# Patient Record
Sex: FEMALE | Race: OTHER | HISPANIC OR LATINO | URBAN - METROPOLITAN AREA
[De-identification: names, ages, dates, MRNs, and addresses within clinical notes are randomized per-mention and may not be internally consistent; named-entity substitution may affect disease eponyms.]

---

## 2023-06-09 RX ORDER — LEVOTHYROXINE SODIUM 125 MCG
1 TABLET ORAL
Refills: 0 | DISCHARGE

## 2023-06-09 RX ORDER — SODIUM CHLORIDE 9 MG/ML
3 INJECTION INTRAMUSCULAR; INTRAVENOUS; SUBCUTANEOUS EVERY 8 HOURS
Refills: 0 | Status: DISCONTINUED | OUTPATIENT
Start: 2023-06-10 | End: 2023-06-10

## 2023-06-09 RX ORDER — LEFLUNOMIDE 10 MG/1
1 TABLET ORAL
Refills: 0 | DISCHARGE

## 2023-06-09 RX ORDER — DICLOFENAC SODIUM 30 MG/G
2 GEL TOPICAL
Refills: 0 | DISCHARGE

## 2023-06-09 NOTE — ASU PATIENT PROFILE, ADULT - AS SC BRADEN FRICTION
RN spoke to the pt- advised her to leave incision JONAH and no creams, ointments at this time. Pt stated understanding.    (3) no apparent problem

## 2023-06-09 NOTE — ASU PATIENT PROFILE, ADULT - FALL HARM RISK - UNIVERSAL INTERVENTIONS
Bed in lowest position, wheels locked, appropriate side rails in place/Call bell, personal items and telephone in reach/Instruct patient to call for assistance before getting out of bed or chair/Non-slip footwear when patient is out of bed/[place] to call system/Physically safe environment - no spills, clutter or unnecessary equipment/Purposeful Proactive Rounding/Room/bathroom lighting operational, light cord in reach

## 2023-06-10 ENCOUNTER — OUTPATIENT (OUTPATIENT)
Dept: OUTPATIENT SERVICES | Facility: HOSPITAL | Age: 48
LOS: 1 days | Discharge: HOME | End: 2023-06-10
Payer: COMMERCIAL

## 2023-06-10 VITALS
TEMPERATURE: 97 F | SYSTOLIC BLOOD PRESSURE: 104 MMHG | OXYGEN SATURATION: 100 % | HEART RATE: 67 BPM | DIASTOLIC BLOOD PRESSURE: 55 MMHG | RESPIRATION RATE: 11 BRPM

## 2023-06-10 VITALS
RESPIRATION RATE: 16 BRPM | WEIGHT: 130.07 LBS | DIASTOLIC BLOOD PRESSURE: 61 MMHG | HEIGHT: 62 IN | SYSTOLIC BLOOD PRESSURE: 94 MMHG | TEMPERATURE: 98 F | HEART RATE: 86 BPM

## 2023-06-10 DIAGNOSIS — Z98.891 HISTORY OF UTERINE SCAR FROM PREVIOUS SURGERY: Chronic | ICD-10-CM

## 2023-06-10 DIAGNOSIS — Z98.890 OTHER SPECIFIED POSTPROCEDURAL STATES: Chronic | ICD-10-CM

## 2023-06-10 LAB
BLD GP AB SCN SERPL QL: NEGATIVE — SIGNIFICANT CHANGE UP
RH IG SCN BLD-IMP: POSITIVE — SIGNIFICANT CHANGE UP

## 2023-06-10 PROCEDURE — 58558 HYSTEROSCOPY BIOPSY: CPT

## 2023-06-10 PROCEDURE — 86901 BLOOD TYPING SEROLOGIC RH(D): CPT

## 2023-06-10 PROCEDURE — 88305 TISSUE EXAM BY PATHOLOGIST: CPT | Mod: 26

## 2023-06-10 PROCEDURE — 86850 RBC ANTIBODY SCREEN: CPT

## 2023-06-10 PROCEDURE — 86900 BLOOD TYPING SEROLOGIC ABO: CPT

## 2023-06-10 PROCEDURE — 88305 TISSUE EXAM BY PATHOLOGIST: CPT

## 2023-06-10 DEVICE — MYOSURE TISSUE REMOVAL DEVICE REACH: Type: IMPLANTABLE DEVICE | Status: FUNCTIONAL

## 2023-06-10 DEVICE — MYOSURE TISSUE REMOVAL DEVICE XL: Type: IMPLANTABLE DEVICE | Status: FUNCTIONAL

## 2023-06-10 RX ORDER — ONDANSETRON 8 MG/1
8 TABLET, FILM COATED ORAL EVERY 8 HOURS
Refills: 0 | Status: DISCONTINUED | OUTPATIENT
Start: 2023-06-10 | End: 2023-06-10

## 2023-06-10 RX ORDER — SODIUM CHLORIDE 9 MG/ML
1000 INJECTION, SOLUTION INTRAVENOUS
Refills: 0 | Status: DISCONTINUED | OUTPATIENT
Start: 2023-06-10 | End: 2023-06-10

## 2023-06-10 RX ORDER — ACETAMINOPHEN 500 MG
1000 TABLET ORAL EVERY 6 HOURS
Refills: 0 | Status: DISCONTINUED | OUTPATIENT
Start: 2023-06-10 | End: 2023-06-10

## 2023-06-10 RX ORDER — SIMETHICONE 80 MG/1
80 TABLET, CHEWABLE ORAL EVERY 6 HOURS
Refills: 0 | Status: DISCONTINUED | OUTPATIENT
Start: 2023-06-10 | End: 2023-06-10

## 2023-06-10 RX ADMIN — Medication 1000 MILLIGRAM(S): at 11:12

## 2023-06-10 NOTE — PRE-ANESTHESIA EVALUATION ADULT - NSANTHOSAYNRD_GEN_A_CORE
No. LIBORIO screening performed.  STOP BANG Legend: 0-2 = LOW Risk; 3-4 = INTERMEDIATE Risk; 5-8 = HIGH Risk

## 2023-06-10 NOTE — ASU DISCHARGE PLAN (ADULT/PEDIATRIC) - CARE PROVIDER_API CALL
Diony Nguyen  Obstetrics and Gynecology  [address], [address]  [place] [address]  Phone: [phone]  Fax: [phone]  Follow Up Time:

## 2023-06-10 NOTE — ASU DISCHARGE PLAN (ADULT/PEDIATRIC) - NS MD DC FALL RISK RISK
For information on Fall & Injury Prevention, visit: [email or web address] OR  [email or web address] OR  https://www.cdc.gov/steadi/patient.html

## 2023-06-22 LAB — SURGICAL PATHOLOGY STUDY: SIGNIFICANT CHANGE UP

## 2025-01-11 ENCOUNTER — EMERGENCY (EMERGENCY)
Facility: HOSPITAL | Age: 50
LOS: 1 days | Discharge: HOME | End: 2025-01-11
Attending: EMERGENCY MEDICINE | Admitting: EMERGENCY MEDICINE
Payer: COMMERCIAL

## 2025-01-11 VITALS
HEIGHT: 62 IN | HEART RATE: 107 BPM | SYSTOLIC BLOOD PRESSURE: 145 MMHG | RESPIRATION RATE: 18 BRPM | WEIGHT: 128.09 LBS | DIASTOLIC BLOOD PRESSURE: 65 MMHG | OXYGEN SATURATION: 100 % | TEMPERATURE: 99 F

## 2025-01-11 VITALS
TEMPERATURE: 98 F | DIASTOLIC BLOOD PRESSURE: 67 MMHG | OXYGEN SATURATION: 100 % | SYSTOLIC BLOOD PRESSURE: 106 MMHG | RESPIRATION RATE: 16 BRPM | HEART RATE: 67 BPM

## 2025-01-11 DIAGNOSIS — Z91.040 LATEX ALLERGY STATUS: ICD-10-CM

## 2025-01-11 DIAGNOSIS — Z88.0 ALLERGY STATUS TO PENICILLIN: ICD-10-CM

## 2025-01-11 DIAGNOSIS — Z98.890 OTHER SPECIFIED POSTPROCEDURAL STATES: Chronic | ICD-10-CM

## 2025-01-11 DIAGNOSIS — R22.0 LOCALIZED SWELLING, MASS AND LUMP, HEAD: ICD-10-CM

## 2025-01-11 DIAGNOSIS — R05.1 ACUTE COUGH: ICD-10-CM

## 2025-01-11 DIAGNOSIS — R00.0 TACHYCARDIA, UNSPECIFIED: ICD-10-CM

## 2025-01-11 DIAGNOSIS — N73.9 FEMALE PELVIC INFLAMMATORY DISEASE, UNSPECIFIED: ICD-10-CM

## 2025-01-11 DIAGNOSIS — E03.9 HYPOTHYROIDISM, UNSPECIFIED: ICD-10-CM

## 2025-01-11 DIAGNOSIS — D25.9 LEIOMYOMA OF UTERUS, UNSPECIFIED: ICD-10-CM

## 2025-01-11 DIAGNOSIS — Z98.891 HISTORY OF UTERINE SCAR FROM PREVIOUS SURGERY: Chronic | ICD-10-CM

## 2025-01-11 PROBLEM — M06.9 RHEUMATOID ARTHRITIS, UNSPECIFIED: Chronic | Status: ACTIVE | Noted: 2023-06-09

## 2025-01-11 PROBLEM — E06.3 AUTOIMMUNE THYROIDITIS: Chronic | Status: ACTIVE | Noted: 2023-06-09

## 2025-01-11 LAB
ALBUMIN SERPL ELPH-MCNC: 4.2 G/DL — SIGNIFICANT CHANGE UP (ref 3.3–5)
ALP SERPL-CCNC: 85 U/L — SIGNIFICANT CHANGE UP (ref 40–120)
ALT FLD-CCNC: 17 U/L — SIGNIFICANT CHANGE UP (ref 10–45)
ANION GAP SERPL CALC-SCNC: 13 MMOL/L — SIGNIFICANT CHANGE UP (ref 5–17)
APPEARANCE UR: CLEAR — SIGNIFICANT CHANGE UP
AST SERPL-CCNC: SIGNIFICANT CHANGE UP (ref 10–40)
BASOPHILS # BLD AUTO: 0 K/UL — SIGNIFICANT CHANGE UP (ref 0–0.2)
BASOPHILS NFR BLD AUTO: 0 % — SIGNIFICANT CHANGE UP (ref 0–2)
BILIRUB SERPL-MCNC: 0.3 MG/DL — SIGNIFICANT CHANGE UP (ref 0.2–1.2)
BILIRUB UR-MCNC: NEGATIVE — SIGNIFICANT CHANGE UP
BUN SERPL-MCNC: 9 MG/DL — SIGNIFICANT CHANGE UP (ref 7–23)
CALCIUM SERPL-MCNC: 9.4 MG/DL — SIGNIFICANT CHANGE UP (ref 8.4–10.5)
CHLORIDE SERPL-SCNC: 101 MMOL/L — SIGNIFICANT CHANGE UP (ref 96–108)
CK MB CFR SERPL CALC: 3.5 NG/ML — SIGNIFICANT CHANGE UP (ref 0–6.7)
CO2 SERPL-SCNC: 23 MMOL/L — SIGNIFICANT CHANGE UP (ref 22–31)
COLOR SPEC: YELLOW — SIGNIFICANT CHANGE UP
CREAT SERPL-MCNC: 0.6 MG/DL — SIGNIFICANT CHANGE UP (ref 0.5–1.3)
DIFF PNL FLD: NEGATIVE — SIGNIFICANT CHANGE UP
EGFR: 111 ML/MIN/1.73M2 — SIGNIFICANT CHANGE UP
EOSINOPHIL # BLD AUTO: 1.04 K/UL — HIGH (ref 0–0.5)
EOSINOPHIL NFR BLD AUTO: 8.8 % — HIGH (ref 0–6)
FLUAV AG NPH QL: SIGNIFICANT CHANGE UP
FLUBV AG NPH QL: SIGNIFICANT CHANGE UP
GLUCOSE SERPL-MCNC: 89 MG/DL — SIGNIFICANT CHANGE UP (ref 70–99)
GLUCOSE UR QL: NEGATIVE MG/DL — SIGNIFICANT CHANGE UP
HCT VFR BLD CALC: 35.3 % — SIGNIFICANT CHANGE UP (ref 34.5–45)
HGB BLD-MCNC: 11.3 G/DL — LOW (ref 11.5–15.5)
KETONES UR-MCNC: 15 MG/DL
LACTATE SERPL-SCNC: 1 MMOL/L — SIGNIFICANT CHANGE UP (ref 0.5–2)
LEUKOCYTE ESTERASE UR-ACNC: ABNORMAL
LIDOCAIN IGE QN: 50 U/L — SIGNIFICANT CHANGE UP (ref 7–60)
LYMPHOCYTES # BLD AUTO: 0.32 K/UL — LOW (ref 1–3.3)
LYMPHOCYTES # BLD AUTO: 2.7 % — LOW (ref 13–44)
MCHC RBC-ENTMCNC: 25.9 PG — LOW (ref 27–34)
MCHC RBC-ENTMCNC: 32 G/DL — SIGNIFICANT CHANGE UP (ref 32–36)
MCV RBC AUTO: 80.8 FL — SIGNIFICANT CHANGE UP (ref 80–100)
MONOCYTES # BLD AUTO: 0.32 K/UL — SIGNIFICANT CHANGE UP (ref 0–0.9)
MONOCYTES NFR BLD AUTO: 2.7 % — SIGNIFICANT CHANGE UP (ref 2–14)
NEUTROPHILS # BLD AUTO: 10.18 K/UL — HIGH (ref 1.8–7.4)
NEUTROPHILS NFR BLD AUTO: 85.8 % — HIGH (ref 43–77)
NITRITE UR-MCNC: NEGATIVE — SIGNIFICANT CHANGE UP
PH UR: 6.5 — SIGNIFICANT CHANGE UP (ref 5–8)
PLATELET # BLD AUTO: 258 K/UL — SIGNIFICANT CHANGE UP (ref 150–400)
POTASSIUM SERPL-MCNC: 4.4 MMOL/L — SIGNIFICANT CHANGE UP (ref 3.5–5.3)
POTASSIUM SERPL-SCNC: 4.4 MMOL/L — SIGNIFICANT CHANGE UP (ref 3.5–5.3)
PROT SERPL-MCNC: 8.1 G/DL — SIGNIFICANT CHANGE UP (ref 6–8.3)
PROT UR-MCNC: NEGATIVE MG/DL — SIGNIFICANT CHANGE UP
RBC # BLD: 4.37 M/UL — SIGNIFICANT CHANGE UP (ref 3.8–5.2)
RBC # FLD: 14.8 % — HIGH (ref 10.3–14.5)
RSV RNA NPH QL NAA+NON-PROBE: SIGNIFICANT CHANGE UP
SARS-COV-2 RNA SPEC QL NAA+PROBE: SIGNIFICANT CHANGE UP
SODIUM SERPL-SCNC: 137 MMOL/L — SIGNIFICANT CHANGE UP (ref 135–145)
SP GR SPEC: 1.02 — SIGNIFICANT CHANGE UP (ref 1–1.03)
TROPONIN T, HIGH SENSITIVITY RESULT: <6 NG/L — SIGNIFICANT CHANGE UP (ref 0–51)
UROBILINOGEN FLD QL: 0.2 MG/DL — SIGNIFICANT CHANGE UP (ref 0.2–1)
WBC # BLD: 11.86 K/UL — HIGH (ref 3.8–10.5)
WBC # FLD AUTO: 11.86 K/UL — HIGH (ref 3.8–10.5)

## 2025-01-11 PROCEDURE — 82550 ASSAY OF CK (CPK): CPT

## 2025-01-11 PROCEDURE — 87637 SARSCOV2&INF A&B&RSV AMP PRB: CPT

## 2025-01-11 PROCEDURE — 76856 US EXAM PELVIC COMPLETE: CPT | Mod: 26,59

## 2025-01-11 PROCEDURE — 83605 ASSAY OF LACTIC ACID: CPT

## 2025-01-11 PROCEDURE — 96374 THER/PROPH/DIAG INJ IV PUSH: CPT

## 2025-01-11 PROCEDURE — 87481 CANDIDA DNA AMP PROBE: CPT

## 2025-01-11 PROCEDURE — 87086 URINE CULTURE/COLONY COUNT: CPT

## 2025-01-11 PROCEDURE — 99285 EMERGENCY DEPT VISIT HI MDM: CPT

## 2025-01-11 PROCEDURE — 99285 EMERGENCY DEPT VISIT HI MDM: CPT | Mod: 25

## 2025-01-11 PROCEDURE — 76830 TRANSVAGINAL US NON-OB: CPT

## 2025-01-11 PROCEDURE — 81001 URINALYSIS AUTO W/SCOPE: CPT

## 2025-01-11 PROCEDURE — 93005 ELECTROCARDIOGRAM TRACING: CPT

## 2025-01-11 PROCEDURE — 76856 US EXAM PELVIC COMPLETE: CPT

## 2025-01-11 PROCEDURE — 96375 TX/PRO/DX INJ NEW DRUG ADDON: CPT

## 2025-01-11 PROCEDURE — 87040 BLOOD CULTURE FOR BACTERIA: CPT

## 2025-01-11 PROCEDURE — 36415 COLL VENOUS BLD VENIPUNCTURE: CPT

## 2025-01-11 PROCEDURE — 84484 ASSAY OF TROPONIN QUANT: CPT

## 2025-01-11 PROCEDURE — 87077 CULTURE AEROBIC IDENTIFY: CPT

## 2025-01-11 PROCEDURE — 0241U: CPT

## 2025-01-11 PROCEDURE — 71046 X-RAY EXAM CHEST 2 VIEWS: CPT

## 2025-01-11 PROCEDURE — 96372 THER/PROPH/DIAG INJ SC/IM: CPT | Mod: XU

## 2025-01-11 PROCEDURE — 76830 TRANSVAGINAL US NON-OB: CPT | Mod: 26

## 2025-01-11 PROCEDURE — 85025 COMPLETE CBC W/AUTO DIFF WBC: CPT

## 2025-01-11 PROCEDURE — 80053 COMPREHEN METABOLIC PANEL: CPT

## 2025-01-11 PROCEDURE — 87661 TRICHOMONAS VAGINALIS AMPLIF: CPT

## 2025-01-11 PROCEDURE — 71046 X-RAY EXAM CHEST 2 VIEWS: CPT | Mod: 26

## 2025-01-11 PROCEDURE — 83690 ASSAY OF LIPASE: CPT

## 2025-01-11 PROCEDURE — 82553 CREATINE MB FRACTION: CPT

## 2025-01-11 PROCEDURE — 93010 ELECTROCARDIOGRAM REPORT: CPT

## 2025-01-11 PROCEDURE — 81513 NFCT DS BV RNA VAG FLU ALG: CPT

## 2025-01-11 RX ORDER — DOXYCYCLINE MONOHYDRATE 100 MG
1 TABLET ORAL
Qty: 20 | Refills: 0
Start: 2025-01-11 | End: 2025-01-20

## 2025-01-11 RX ORDER — CEFOTETAN DISODIUM 2 G
2 VIAL (EA) INJECTION ONCE
Refills: 0 | Status: COMPLETED | OUTPATIENT
Start: 2025-01-11 | End: 2025-01-11

## 2025-01-11 RX ORDER — CEFTRIAXONE SODIUM 1 G/1
500 INJECTION, POWDER, FOR SOLUTION INTRAMUSCULAR; INTRAVENOUS ONCE
Refills: 0 | Status: COMPLETED | OUTPATIENT
Start: 2025-01-11 | End: 2025-01-11

## 2025-01-11 RX ORDER — DOXYCYCLINE MONOHYDRATE 100 MG
100 TABLET ORAL ONCE
Refills: 0 | Status: COMPLETED | OUTPATIENT
Start: 2025-01-11 | End: 2025-01-11

## 2025-01-11 RX ORDER — SODIUM CHLORIDE 9 MG/ML
1000 INJECTION, SOLUTION INTRAMUSCULAR; INTRAVENOUS; SUBCUTANEOUS ONCE
Refills: 0 | Status: COMPLETED | OUTPATIENT
Start: 2025-01-11 | End: 2025-01-11

## 2025-01-11 RX ADMIN — Medication 110 MILLIGRAM(S): at 22:05

## 2025-01-11 RX ADMIN — CEFTRIAXONE SODIUM 500 MILLIGRAM(S): 1 INJECTION, POWDER, FOR SOLUTION INTRAMUSCULAR; INTRAVENOUS at 22:09

## 2025-01-11 RX ADMIN — SODIUM CHLORIDE 1000 MILLILITER(S): 9 INJECTION, SOLUTION INTRAMUSCULAR; INTRAVENOUS; SUBCUTANEOUS at 16:17

## 2025-01-11 RX ADMIN — Medication 100 GRAM(S): at 19:55

## 2025-01-11 NOTE — ED ADULT NURSE NOTE - CHIEF COMPLAINT QUOTE
Pt c/o allergic rxn to H. Pylori medication x 2 days. Pt continued taking medication, last dose this morning. C/o swelling around mouth/lips, throat and ear irritation. Denies voice changes, sob. Pt given 50mg benadryl IM and 10mg decadron IM at [facility]. Pt speaking in full, clear sentences.

## 2025-01-11 NOTE — ED PROVIDER NOTE - CLINICAL SUMMARY MEDICAL DECISION MAKING FREE TEXT BOX
49-year-old with 4 days of cough and now developed sensation of facial fullness and lip swelling as well as discomfort swallowing after starting new antibiotics, consider allergic reaction but I am not 100% convinced this was the cause of discomfort swallowing, consider esophagitis from antibiotics as well as consider sore throat/pharyngitis from viral illness, as symptoms are accompanied by a dry cough as well as had a low-grade fever at urgent care today.  Patient's vaginal complaint most likely consistent with yeast infection, plan to evaluate for infection, pelvic exam to evaluate for pelvic infection,  will advised to hold off on H. pylori treatment until further evaluated by allergist.  If exam consistent with likely yeast infection will give Diflucan, will also send cultures and evaluate for PID but suspect less likely.  Dispo pending workup and reevaluation. 49-year-old with 4 days of cough and now developed sensation of facial fullness and lip swelling as well as discomfort swallowing after starting new antibiotics, consider allergic reaction but I am not 100% convinced this was the cause of discomfort swallowing, consider esophagitis from antibiotics as well as consider sore throat/pharyngitis from viral illness, as symptoms are accompanied by a dry cough as well as had a low-grade fever at urgent care today.  Patient's vaginal complaint most likely consistent with yeast infection, plan to evaluate for infection, pelvic exam to evaluate for pelvic infection,  will advised to hold off on H. pylori treatment until further evaluated by allergist.  If exam consistent with likely yeast infection will give Diflucan, will also send cultures and evaluate for PID but suspect less likely.  Dispo pending workup and reevaluation.    FU : pt w pelvic exam very concerning for PID , copious yellow / green discharge, CMT and rt adnexal tender, plan to send for US to eval for TOA, gave cefotetan and doxy, ( avoided metronidazole due to possibility of  recent reaction ) , pt w prior amox reaction although after discussing it I suspect pt w throat pain from her pharyngitis at that time rather than true allergic reaction .   will consult OB/gyn as would consider admission . 49-year-old with 4 days of cough and now developed sensation of facial fullness and lip swelling as well as discomfort swallowing after starting new antibiotics, consider allergic reaction but I am not 100% convinced this was the cause of discomfort swallowing, consider esophagitis from antibiotics as well as consider sore throat/pharyngitis from viral illness, as symptoms are accompanied by a dry cough as well as had a low-grade fever at urgent care today.  Patient's vaginal complaint most likely consistent with yeast infection, plan to evaluate for infection, pelvic exam to evaluate for pelvic infection,  will advised to hold off on H. pylori treatment until further evaluated by allergist.  If exam consistent with likely yeast infection will give Diflucan, will also send cultures and evaluate for PID but suspect less likely.  Dispo pending workup and reevaluation.    FU : pt w pelvic exam very concerning for PID , copious yellow / green discharge, CMT and rt adnexal tender, plan to send for US to eval for TOA, gave cefotetan and doxy, ( avoided metronidazole due to possibility of  recent reaction ) , pt w prior amox reaction although after discussing it I suspect pt w throat pain from her pharyngitis at that time rather than true allergic reaction .     7:30 pm fu pt at ultrasound now , Gyn aware and will consult   will consult OB/gyn as would consider admission .

## 2025-01-11 NOTE — ED PROVIDER NOTE - OBJECTIVE STATEMENT
49-year-old history of hypothyroid ,  rheumatoid arthritis, and H. pylori,  Patient comes in after concern for possible allergic reaction as she started H. pylori meds 2 days ago and today developed lip swelling and facial puffyness,  in addition patient reports prior to start of medication she had a cough and mild sore throat, reports sensation of difficulty swallowing although eating and drinking, no shortness of breath, no rash,  partner with her unsure if lips are swollen, patient went to urgent care where they gave her Decadron and Benadryl,  In addition patient reports itchiness and white discharge vaginally since start of antibiotics.  History of similar event in the past with antibiotics, 49-year-old history of hypothyroid,  rheumatoid arthritis, and H. pylori,  Patient comes in after concern for possible allergic reaction as she started H. pylori meds 2 days ago and today developed lip swelling and facial puffiness,  in addition patient reports prior to start of medication she had a cough and mild sore throat, reports sensation of difficulty swallowing although eating and drinking, no shortness of breath, no rash,  partner with her unsure if lips are swollen, patient went to urgent care where they gave her Decadron and Benadryl,  In addition patient reports itchiness and white discharge vaginally since start of antibiotics 2 days ago. sexually active w , no ho of STD's   History of similar event in the past with antibiotics, abd discomfort across lower pelvis for same time.

## 2025-01-11 NOTE — CONSULT NOTE ADULT - ASSESSMENT
48 yo  w/ LMP 1/3/25 presents from urgent care after noted to have allergic reaction, recently started triple therapy for H. pylori infection, given dexamethasone and diphenhydramine with good response, noted to have pelvic pain and vaginal discharge. GYN consulted for vaginal discharge. Suspected cervicitis at this time given cervical motion tenderness and vaginal discharge without other pelvic or systemic findings.    - Please given IM ceftriaxone 500mg once  - Please Rx PO doxycycline 100mg BID x14 days  - Follow up urine culture, blood culture, vaginitis NAAT panel, and vaginitis w/ candida panel  - Will defer empiric treatment for pelvic inflammatory disease at this time due to possible allergy to metronidazole  - Reviewed return precautions including severe pain, vaginal bleeding, or fever  - Patient will follow up with OBGYN next week.    GL PGY2  Discussed with PGY3 Dr. Blackwood and attending Dr. Nguyen

## 2025-01-11 NOTE — ED PROVIDER NOTE - CARE PROVIDERS DIRECT ADDRESSES
[email or web address]

## 2025-01-11 NOTE — ED ADULT TRIAGE NOTE - CHIEF COMPLAINT QUOTE
Pt c/o allergic rxn to H. Pylori medication x 2 days. Pt continued taking medication, last dose this morning. C/o swelling around mouth/lips, throat and ear irritation. Denies voice changes, sob. Pt given 50mg benadryl IM and 10mg decadron IM at [facility]. Pt speaking in full, clear sentences.

## 2025-01-11 NOTE — ED ADULT NURSE NOTE - AS PAIN REST
Per pharmacy fax, pt states back on pioglitazone, stopped januvia, can you send new RX.     Per Pharmacy fax there are no changes in medication, dosage, sig or quantity. The medication(s) are set up as pending and waiting for your approval. The preferred pharmacy has been set up and verified.     
Refill requested for:   pioglitazone (ACTOS) 30 MG tablet        Sig - Route: Take 30 mg by mouth daily. - Oral      Last visit: 05/13/2021 with: Gopi Child DO for: HTN    Upcoming visit: none    Request forwarded to Gopi Child DO  Please advise on refills.  
0 (no pain/absence of nonverbal indicators of pain)

## 2025-01-11 NOTE — ED PROVIDER NOTE - CARE PROVIDER_API CALL
Diony Nguyen  Obstetrics and Gynecology  [address], [address]  [place] [address]  Phone: [phone]  Fax: [phone]  Follow Up Time:

## 2025-01-11 NOTE — ED PROVIDER NOTE - PATIENT PORTAL LINK FT
You can access the FollowMyHealth Patient Portal offered by [facility] by registering at the following website: [email or web address]. By joining [facility]’s FollowMyHealth portal, you will also be able to view your health information using other applications (apps) compatible with our system.

## 2025-01-11 NOTE — ED ADULT TRIAGE NOTE - WEIGHT METHOD
seated/standing, engagement in fine and gross motor activities, functional transfers and mobility  Sensory integration techniques (69746): that include enhancing sensory processing and promoting responses that are adaptive to environmental demands  Self-care/home management training (85161): that includes restorative and compensatory training in activities of daily living, meal preparation, laundry and other various house maintenance activities. May include education and training in use of adaptive equipment/devices and assistive technology to promote participation and independence.  Cognitive function (21631 initial 15 minutes, 65222 each additional 15 minutes w/ maximum of 3 units billable): activities that address attention, memory, reasoning, executive functioning, problem solving, and/or pragmatic functioning in addition to compensatory strategies to manage the performance of an activity (for example, managing time or schedules, initiating, organizing, and sequencing tasks).  Splinting including custom or pre-fabricated    Plan: Cont per POC    Electronically Signed by Jodi Matthews  Date: 12/19/2024        stated

## 2025-01-11 NOTE — CONSULT NOTE ADULT - SUBJECTIVE AND OBJECTIVE BOX
STEPAN PINEDA     [id number]  50 yo  w/ LMP 1/3/25 presents from urgent care after noted to have allergic reaction, recently started triple therapy for H. pylori infection, given dexamethasone and diphenhydramine with good response, noted to have pelvic pain and vaginal discharge. GYN consulted for vaginal discharge.     Patient reports that she has a long standing history of acid reflux type symptoms. She was seen by GI and underwent upper endoscopy, which revealed H. pylori infection. She was started on triple therapy (i.e., tetracycline, bismuth, and metronidazole) two days ago and today noted to have lip swelling and facial puffiness. She presented to urgent care who recommended prompt transfer to ED. Here, vitals were stable. She was given decadron and benadryl with good symptomatic relief. She then endorsed pelvic pain and vaginal discharge to her provider, who deferred to GYN.    Pelvic pain has been intermittent for approximately 1 week. She denies cramping, nausea, vomiting, fevers. Pelvic pain is bilateral just medial to bilateral ASIS and unchanged with position. Additionally, she has had copious white vaginal discharge that is now causing her to have vaginal irritation and itching. Endorses mild dysuria. Sexually active with 1 male partner: her .    OB: G1  primary CS for hx myomectomy  GYN: Fibroids  PMH: Hypothyroidism; Rheumatoid arthritis; GERD w/ proven H. pylori  PSH: C/S x1; Open myomectomy x2 (2023)    Home Medications:  Arava 20 mg oral tablet: 1 orally once a day (2023 10:37)  diclofenac 1% topical gel: Apply topically to affected area prn (2023 10:37)  levothyroxine 100 mcg (0.1 mg) oral capsule: 1 orally once a day (2023 10:37)  naproxen 250 mg oral tablet: 1 orally 2 times a day (2023 10:37)    Allergies  latex (Rash)  amoxicillin (Angioedema)        PHYSICAL EXAM:   T(C): 36.7 (25 @ 22:40), Max: 37 (25 @ 13:55)  HR: 67 (25 @ 22:40) (67 - 107)  BP: 106/67 (25 @ 22:40) (106/67 - 145/65)  RR: 16 (25 @ 22:40) (16 - 18)  SpO2: 100% (25 @ 22:40) (100% - 100%)    **************************  Constitutional: No acute distress  Respiratory: No increased work of breathing  Abdomen: soft, non tender, positive bowel sounds, no rebound or guarding   Extremities: no calf tenderness or swelling bilaterally  SSE: normal external genitalia; vaginal well supported; copious white serous discharge in vaginal vault; cervix without lesions or bleeding  Bimanual: moderate cervical motion tenderness; minimal bilateral adnexal tenderness      LABS:                        11.3   11. )-----------( 258      ( 2025 14:38 )             35.3       137  |  101  |  9   ----------------------------<  89  4.4   |  23  |  0.60  Ca    9.4      2025 14:38  TPro  8.1  /  Alb  4.2  /  TBili  0.3  /  DBili  x   /  AST  See Note  /  ALT  17  /  AlkPhos  85      Urinalysis Basic - ( 2025 14:38 )  Color: Yellow  Appearance: Clear / S.016 / pH: x  Gluc: 89 mg/dL / Ketone: 15 mg/dL  / Bili: Negative / Urobili: 0.2 mg/dL   Blood: x / Protein: Negative mg/dL / Nitrite: Negative   Leuk Esterase: Moderate / RBC: 4 /HPF / WBC 21 /HPF   Sq Epi: x / Non Sq Epi: 2 /HPF / Bacteria: Negative /HPF      RADIOLOGY & ADDITIONAL STUDIES:    US Pelvis Complete (25 @ 19:29)  TECHNIQUE:  Endovaginal and transabdominal pelvic sonogram. Color and Spectral   Doppler was performed.    FINDINGS:  Uterus: 8.6 cm x 5.0 cm x 7.2 cm. 3.9 cm intramural fibroid at the left   uterine body. 1.1 cm intramural fibroid at the posterior fundus.    scar at the anterior lower uterine segment.  Endometrium: 6 mm. Within normal limits.  Right ovary: 2.0 cm x 1.6 cm x 1.6 cm. Within normal limits. Normal   arterial and venous waveforms.  Left ovary: 2.0 cm x 1.5 cm x 2.0 cm. Within normal limits. Normal   arterial and venous waveforms.  Fluid: None.    IMPRESSION:  No acute findings. No evidence of ovarian torsion during this study. No   adnexal mass or evidence of tubo-ovarian abscess. 3.9 cm dominant fibroid   at the left uterine body.

## 2025-01-11 NOTE — ED ADULT NURSE NOTE - OBJECTIVE STATEMENT
Pt alert, oriented, and ambulatory at time of assessment. Reports facial redness and suspected allergic reaction x 2 days to antibiotic pt is using to treat suspected H pylori. Pt additionally suspects a yeast infection. Denies dizziness or weakness. Denies N/V/D. No airway compromise noted. No wheezing on auscultation noted.

## 2025-01-11 NOTE — ED ADULT NURSE NOTE - NSFALLUNIVINTERV_ED_ALL_ED
Bed/Stretcher in lowest position, wheels locked, appropriate side rails in place/Call bell, personal items and telephone in reach/Instruct patient to call for assistance before getting out of bed/chair/stretcher/Non-slip footwear applied when patient is off stretcher/[place] to call system/Physically safe environment - no spills, clutter or unnecessary equipment/Purposeful proactive rounding/Room/bathroom lighting operational, light cord in reach

## 2025-01-11 NOTE — ED PROVIDER NOTE - NSFOLLOWUPINSTRUCTIONS_ED_ALL_ED_FT
Please follow up with your doctor about the medications to take for your H pylori    Regarding your pelvic infection, please take doxyclycine twice a day for 2 weeks - don't miss a single dose!!  Its very important that you are seen in a week by your OBGYN or ours to follow up and make sure that you are healing, and that the infection is not progressing      Pelvic Inflammatory Disease    Pelvic inflammatory disease (PID) is an infection in some or all of the female reproductive organs. The infection can be in the uterus, ovaries, fallopian tubes, or the surrounding tissues in the pelvis. PID can cause abdominal or pelvic pain that comes on suddenly (acute pelvic pain).    PID is a serious infection because it can lead to lasting (chronic) pelvic pain or the inability to have children (infertility).    What are the causes?  This condition is most often caused by bacteria that is spread during sexual contact. It can also be caused by a bacterial infection of the vagina (bacterial vaginosis) that is not spread by sexual contact.    This condition occurs when the infection is not treated and the bacteria travel upward from the vagina or cervix into the reproductive organs. Bacteria may also be introduced into the reproductive organs following:  The birth of a baby.  A miscarriage.  An .  Pelvic procedures or surgery.  The insertion of an intrauterine device (IUD).  A sexual assault.  What increases the risk?  You are more likely to develop this condition if you:  Are younger than 25 years of age.  Are sexually active at a young age.  Have a history of STI (sexually transmitted infection) or PID.  Have unprotected sex or do not use contraceptive barrier methods, such as condoms.  Have multiple sexual partners.  Have sex with someone who has symptoms of an STI.  Use a douche.  What are the signs or symptoms?  Symptoms of this condition include:  Abdominal or pelvic pain.  Fever or chills.  Abnormal vaginal discharge.  Abnormal uterine bleeding.  Unusual pain shortly after the end of a menstrual period.  Pain with urination or sex.  Feeling nauseous or vomiting.  How is this diagnosed?  This condition may be diagnosed based on:  Your medical history.  A pelvic exam. This exam can reveal signs of infection, inflammation, and discharge in the vagina and the surrounding tissues. It can also help to identify painful areas.  You may also have tests, including:  A pregnancy test.  Blood tests.  A urine test.  Culture tests of the vagina and cervix to check for an STI.  Ultrasound.  A laparoscopic procedure to look inside the pelvis.  Biopsies of the lining of the uterus (endometrial biopsy).  How is this treated?  This condition may be treated with:  Antibiotic medicines taken by mouth (orally). For more severe cases, antibiotics may be given through an IV at the hospital.  Efforts to stop the spread of the infection. Sexual partners may need to be treated if the infection is caused by an STI.  Surgery. This is rare. Surgery may be needed if other treatments do not help.  It may take weeks until you are completely well. Your health care provider may test you for infection again 3 months after treatment.    If you are diagnosed with PID, you should also be checked for HIV (human immunodeficiency virus).    Follow these instructions at home:  Take over-the-counter and prescription medicines only as told by your health care provider.  If you were prescribed an antibiotic medicine, take it as told by your health care provider. Do not stop using the antibiotic even if you start to feel better.  Do not have sex until treatment is completed or as told by your health care provider. If PID is confirmed, your recent sexual partners will need treatment, especially if you had unprotected sex.  Keep all follow-up visits. This is important.  Contact a health care provider if:  You have increased or abnormal vaginal discharge.  Your pain does not improve.  You have a fever or chills.  You cannot tolerate your medicines.  Your partner has an STI.  You have pain when you urinate.  Get help right away if:  You have increased abdominal or pelvic pain.  Your symptoms are getting worse.  Your symptoms are not better in 72 hours with treatment.  Summary  Pelvic inflammatory disease (PID) is caused by an infection in some or all of the female reproductive organs.  PID is a serious infection because it can lead to lasting (chronic) pelvic pain or the inability to have children (infertility).  This infection is usually treated with antibiotic medicines.  Do not have sex until treatment is completed or as told by your health care provider.          Allergic Reaction    An allergic reaction is an abnormal reaction to a substance (allergen) by the body's defense system. Common allergens include medicines, food, insect bites or stings, and blood products. The body releases certain proteins into the blood that can cause a variety of symptoms such as an itchy rash, wheezing, swelling of the face/lips/tongue/throat, abdominal pain, nausea or vomiting. An allergic reaction is usually treated with medication. If your health care provider prescribed you an epinephrine injection device, make sure to keep it with you at all times.    SEEK IMMEDIATE MEDICAL CARE IF YOU HAVE ANY OF THE FOLLOWING SYMPTOMS: allergic reaction severe enough that required you to use epinephrine, tightness in your chest, swelling around your lips/tongue/throat, abdominal pain, vomiting or diarrhea, or lightheadedness/dizziness. These symptoms may represent a serious problem that is an emergency. Do not wait to see if the symptoms will go away. Use your auto-injector pen or anaphylaxis kit as you have been instructed. Call 911 and do not drive yourself to the hospital.

## 2025-01-12 RX ORDER — DOXYCYCLINE MONOHYDRATE 100 MG
1 TABLET ORAL
Qty: 20 | Refills: 0
Start: 2025-01-12 | End: 2025-01-21

## 2025-01-14 LAB
CULTURE RESULTS: ABNORMAL
SPECIMEN SOURCE: SIGNIFICANT CHANGE UP

## 2025-01-15 LAB
A VAGINAE DNA VAG QL NAA+PROBE: SIGNIFICANT CHANGE UP
BVAB2 DNA VAG QL NAA+PROBE: SIGNIFICANT CHANGE UP
C ALBICANS DNA VAG QL NAA+PROBE: SIGNIFICANT CHANGE UP
C GLABRATA DNA VAG QL NAA+PROBE: SIGNIFICANT CHANGE UP
C KRUSEI DNA VAG QL NAA+PROBE: SIGNIFICANT CHANGE UP
C LUSITANIAE DNA VAG QL NAA+PROBE: SIGNIFICANT CHANGE UP
C TRACH RRNA SPEC QL NAA+PROBE: NEGATIVE — SIGNIFICANT CHANGE UP
CANDIDA DNA VAG QL NAA+PROBE: SIGNIFICANT CHANGE UP
MEGA1 DNA VAG QL NAA+PROBE: SIGNIFICANT CHANGE UP
N GONORRHOEA RRNA SPEC QL NAA+PROBE: NEGATIVE — SIGNIFICANT CHANGE UP
T VAGINALIS RRNA SPEC QL NAA+PROBE: NEGATIVE — SIGNIFICANT CHANGE UP

## 2025-01-16 LAB
CULTURE RESULTS: SIGNIFICANT CHANGE UP
CULTURE RESULTS: SIGNIFICANT CHANGE UP
SPECIMEN SOURCE: SIGNIFICANT CHANGE UP
SPECIMEN SOURCE: SIGNIFICANT CHANGE UP

## (undated) DEVICE — TUBING TUR 2 PRONG

## (undated) DEVICE — MYOSURE SOCK

## (undated) DEVICE — DRAPE IRRIGATION POUCH 19X23"

## (undated) DEVICE — ELCTR PLASMA LOOP MEDIUM ANGLED 24FR 12-30 DEG

## (undated) DEVICE — TUBING AQUILEX OUTFLOW

## (undated) DEVICE — MYOSURE SCOPE SEAL

## (undated) DEVICE — DRAPE TOWEL BLUE 17" X 24"

## (undated) DEVICE — MYOSURE CANISTER AQUILEX KIT

## (undated) DEVICE — PRESSURE INFUSOR BAG 3000ML

## (undated) DEVICE — TUBING AQUILEX INFLOW

## (undated) DEVICE — GOWN TRIMAX XXL

## (undated) DEVICE — PACK D&C

## (undated) DEVICE — SOL IRR BAG NS 0.9% 3000ML

## (undated) DEVICE — VENODYNE/SCD SLEEVE CALF MEDIUM

## (undated) DEVICE — DRSG TELFA 3 X 8

## (undated) DEVICE — WARMING BLANKET UPPER ADULT

## (undated) DEVICE — GLV 8 PROTEXIS (WHITE)